# Patient Record
Sex: MALE | Race: WHITE | NOT HISPANIC OR LATINO | Employment: FULL TIME | URBAN - METROPOLITAN AREA
[De-identification: names, ages, dates, MRNs, and addresses within clinical notes are randomized per-mention and may not be internally consistent; named-entity substitution may affect disease eponyms.]

---

## 2021-07-16 ENCOUNTER — OFFICE VISIT (OUTPATIENT)
Dept: URGENT CARE | Facility: URGENT CARE | Age: 51
End: 2021-07-16
Payer: COMMERCIAL

## 2021-07-16 VITALS
SYSTOLIC BLOOD PRESSURE: 137 MMHG | DIASTOLIC BLOOD PRESSURE: 77 MMHG | HEART RATE: 82 BPM | TEMPERATURE: 98.3 F | OXYGEN SATURATION: 99 %

## 2021-07-16 DIAGNOSIS — N10 ACUTE PYELONEPHRITIS: Primary | ICD-10-CM

## 2021-07-16 DIAGNOSIS — R39.15 URINARY URGENCY: ICD-10-CM

## 2021-07-16 DIAGNOSIS — R30.0 DYSURIA: ICD-10-CM

## 2021-07-16 LAB
ALBUMIN UR-MCNC: 30 MG/DL
APPEARANCE UR: ABNORMAL
BACTERIA #/AREA URNS HPF: ABNORMAL /HPF
BILIRUB UR QL STRIP: NEGATIVE
COLOR UR AUTO: YELLOW
GLUCOSE UR STRIP-MCNC: NEGATIVE MG/DL
HGB UR QL STRIP: ABNORMAL
KETONES UR STRIP-MCNC: NEGATIVE MG/DL
LEUKOCYTE ESTERASE UR QL STRIP: ABNORMAL
NITRATE UR QL: POSITIVE
PH UR STRIP: 7 [PH] (ref 5–7)
RBC #/AREA URNS AUTO: ABNORMAL /HPF
SP GR UR STRIP: 1.01 (ref 1–1.03)
SQUAMOUS #/AREA URNS AUTO: ABNORMAL /LPF
UROBILINOGEN UR STRIP-ACNC: 1 E.U./DL
WBC #/AREA URNS AUTO: >100 /HPF
WBC CLUMPS #/AREA URNS HPF: PRESENT /HPF

## 2021-07-16 PROCEDURE — 99204 OFFICE O/P NEW MOD 45 MIN: CPT | Performed by: FAMILY MEDICINE

## 2021-07-16 PROCEDURE — 87086 URINE CULTURE/COLONY COUNT: CPT | Performed by: FAMILY MEDICINE

## 2021-07-16 PROCEDURE — 87186 SC STD MICRODIL/AGAR DIL: CPT | Performed by: FAMILY MEDICINE

## 2021-07-16 PROCEDURE — 81001 URINALYSIS AUTO W/SCOPE: CPT | Performed by: FAMILY MEDICINE

## 2021-07-16 RX ORDER — CEFDINIR 300 MG/1
300 CAPSULE ORAL 2 TIMES DAILY
Qty: 20 CAPSULE | Refills: 0 | Status: SHIPPED | OUTPATIENT
Start: 2021-07-16 | End: 2021-07-26

## 2021-07-16 RX ORDER — CITALOPRAM HYDROBROMIDE 10 MG/1
10 TABLET ORAL DAILY
COMMUNITY
Start: 2021-06-28

## 2021-07-16 NOTE — PATIENT INSTRUCTIONS
follow up if you have climbing fevers (100.4 F or higher), severe lightheadedness, worsening back/pelvic pain, severe vomiting.      follow up if not better in 5-7 days.      Patient Education     Pyelonephritis or Kidney Infection (Adult Male)  An infection of one or both kidneys is called pyelonephritis. It usually happens when bacteria (or rarely, viruses, fungi, or other disease-causing organisms) get into the kidneys. The bacteria (or other disease-causing organisms) can enter the kidneys from the bladder or blood traveling from other parts of the body to cause pyelonephritis. A kidney infection can become serious. It can cause severe illness, scarring of the kidneys, or kidney failure if not treated properly.    Common causes include:    Not keeping the genital area clean and dry, which promotes the growth of bacteria    Wearing tight pants or underwear, which allows moisture to build up in the genital area, helping bacteria grow    Holding urine for long periods of time    Dehydration  Kidney infections can cause symptoms similar to bladder infections. The infection can cause one or more of these symptoms:     Pain or burning when urinating    Having to urinate more often than usual    Blood in urine (pink or red)    Belly pain or discomfort, usually in the lower abdomen    Pain in the side or back    Pain above the pubic bone    Fever or chills    Vomiting    Loss of appetite  Treatment is oral antibiotics, or in more severe cases, intramuscular or intravenous (IV) antibiotics. These are started right away. Treatment helps prevent a more serious kidney infection.   Medicines  Medicines can help in the treatment of kidney infection:    Take antibiotics until they are used up, even if you feel better. It's important to finish them to make sure the infection is gone.    Unless another medicine was given, you can use over-the-counter medicines for pain, fever, or discomfort. If you have chronic liver or kidney  disease, talk with your healthcare provider before taking these medicines. Also tell your provider if you've ever had a stomach ulcer of gastrointestinal bleeding, or are taking blood thinners.  Home care  These guidelines can help you care for yourself at home:     Stay home from work or school. Rest in bed until your fever breaks and you are feeling better, or as advised by your healthcare provider.    Drink lots of fluid unless you must restrict fluids for other medical reasons. This will force the medicine into your urinary system and help flush the bacteria out of your body. Ask your healthcare provider how much you should drink.    Don't have sex until you have finished all of your medicine and your symptoms are gone.    Don't have caffeine, alcohol, and spicy foods. These foods may irritate the kidneys and bladder.    Wear loose-fitting clothes and cotton underwear.  Prevention  These self-care steps can help prevent future infections:    Drink plenty of fluids to prevent dehydration and flush out the bladder. Do this unless you must restrict your fluids for other health reasons, or your healthcare provider told you not to.    Wash your hands after using the bathroom.    Clean your penis regularly. If you aren't circumcised, pull back the foreskin when cleaning.    Urinate more often. Don't try to hold urine in for a long time.    Don't wear tight-fitting pants or underwear.    Improve your diet and prevent constipation. Eat more fresh fruit, vegetables, and fiber. Eat less junk and fatty foods. Constipation can increase your chance of getting a urinary tract infection. Talk with your healthcare provider if you have trouble with bowel movements.    Urinate right after sex to flush out the bladder.    Don't follow high-risk sexual behaviors.  Follow-up care  Follow up with your healthcare provider, or as advised. You may need more testing to make sure the infection is getting better. Close follow-up and  further testing is very important to find the cause and to prevent future infections.   If a urine culture was done, you will be told if your treatment needs to be changed. If directed, you can call to find out the results.   If you had an X-ray, CT scan, or another diagnostic test, you will be told of any new findings that may affect your care.   Call 911  Call 911 if any of the following occur:     Trouble breathing    Fainting or loss of consciousness    Rapid or very slow heart rate    Weakness, dizziness, or fainting    Trouble arousing or confusion  When to seek medical advice  Call your healthcare provider right away if any of the following occur:    Fever of 100.4 F (38 C) or higher, or as directed by your healthcare provider    Not feeling better within 1 to 2 days after starting antibiotics    Any symptom that continues after 3 days of treatment    Increasing pain in the stomach, back, side, or groin area    Repeated vomiting    Not able to take prescribed medicine due to nausea or another reason    Bloody, dark-colored, or foul smelling urine    Trouble urinating or decreased urine output    No urine for 8 hours, no tears when crying, sunken eyes, or dry mouth    New symptoms or symptoms get worse  [employer] last reviewed this educational content on 3/1/2020    5587-5119 [employer]. All rights reserved. This information is not intended as a substitute for professional medical care. Always follow your healthcare professional's instructions.

## 2021-07-16 NOTE — PROGRESS NOTES
SUBJECTIVE:   Usama Agudelo is a 50 year old male who  presents today for a possible UTI. Symptoms of dark orange cloudy urine with a red tinge, bilateral pelvic discomfort, urinary urgency and left low back pain have been going on since yesterday.  Hematuria yes, with a red tinge..No weaker stream  There is no history of fever, chills, nausea or vomiting.  No history of penile discharge. This patient does not have a history of frequent recurrent urinary tract infections.     No history of sexually transmitted diseases.  No history of prostate problems.       Past Medical History:    Depression    Current Outpatient Medications   Medication Sig Dispense Refill     citalopram (CELEXA) 10 MG tablet Take 10 mg by mouth daily       ibuprofen (ADVIL/MOTRIN) 100 MG tablet Take 100 mg by mouth every 4 hours as needed       Social History     Tobacco Use     Smoking status: Not on file   Substance Use Topics     Alcohol use: Not on file       ROS:   CONSTITUTIONAL:negative for fevers.    INTEGUMENTARY/SKIN: No rashes.  No bruising.    :  Positive for urinary frequency, left low back pain.      OBJECTIVE:  /77   Pulse 82   Temp 98.3  F (36.8  C)   SpO2 99%   GENERAL APPEARANCE: healthy, alert and no distress  ABDOMEN: soft, normal bowel sounds, with some discomfort over the bilateral pelvic regions.  No guarding/rebound/distension.    BACK: There is some left-sided CVA tenderness with percussion.    SKIN: no suspicious lesions or rashes    LAB:    Results for orders placed or performed in visit on 07/16/21   UA reflex to Microscopic and Culture     Status: Abnormal    Specimen: Urine, Clean Catch   Result Value Ref Range    Color Urine Yellow Colorless, Straw, Light Yellow, Yellow    Appearance Urine Cloudy (A) Clear    Glucose Urine Negative Negative mg/dL    Bilirubin Urine Negative Negative    Ketones Urine Negative Negative mg/dL    Specific Gravity Urine 1.010 1.003 - 1.035    Blood Urine Large (A) Negative     pH Urine 7.0 5.0 - 7.0    Protein Albumin Urine 30  (A) Negative mg/dL    Urobilinogen Urine 1.0 0.2, 1.0 E.U./dL    Nitrite Urine Positive (A) Negative    Leukocyte Esterase Urine Large (A) Negative   Urine Microscopic Exam     Status: Abnormal   Result Value Ref Range    Bacteria Urine Moderate (A) None Seen /HPF    RBC Urine 25-50 (A) 0-2 /HPF /HPF    WBC Urine >100 (A) 0-5 /HPF /HPF    Squamous Epithelials Urine Moderate (A) None Seen /LPF    WBC Clumps Urine Present (A) None Seen /HPF     .      ASSESSMENT:   Urinary urgency  Dysuria  Acute Pyelonephritis.    PLAN:  Rx:  Cefdinir    Pending lab:  Urine Culture.      Drink plenty of fluids.      follow up if you have climbing fevers (100.4 F or higher), severe lightheadedness, worsening back/pelvic pain, severe vomiting.      follow up if not better in 5-7 days.     Sha Lloyd MD

## 2021-07-18 LAB — BACTERIA UR CULT: ABNORMAL

## 2021-07-30 ENCOUNTER — OFFICE VISIT (OUTPATIENT)
Dept: URGENT CARE | Facility: URGENT CARE | Age: 51
End: 2021-07-30
Payer: COMMERCIAL

## 2021-07-30 VITALS
SYSTOLIC BLOOD PRESSURE: 116 MMHG | HEART RATE: 71 BPM | DIASTOLIC BLOOD PRESSURE: 58 MMHG | OXYGEN SATURATION: 100 % | TEMPERATURE: 99.8 F

## 2021-07-30 DIAGNOSIS — R10.9 FLANK PAIN: ICD-10-CM

## 2021-07-30 DIAGNOSIS — R30.0 DYSURIA: ICD-10-CM

## 2021-07-30 DIAGNOSIS — N12 PYELONEPHRITIS: Primary | ICD-10-CM

## 2021-07-30 LAB
ALBUMIN SERPL-MCNC: 3.4 G/DL (ref 3.4–5)
ALBUMIN UR-MCNC: >=300 MG/DL
ALP SERPL-CCNC: 83 U/L (ref 40–150)
ALT SERPL W P-5'-P-CCNC: 19 U/L
ANION GAP SERPL CALCULATED.3IONS-SCNC: 2 MMOL/L (ref 3–14)
APPEARANCE UR: ABNORMAL
AST SERPL W P-5'-P-CCNC: 21 U/L (ref 0–45)
BACTERIA #/AREA URNS HPF: ABNORMAL /HPF
BASOPHILS # BLD AUTO: 0 10E3/UL (ref 0–0.2)
BASOPHILS NFR BLD AUTO: 0 %
BILIRUB SERPL-MCNC: 0.9 MG/DL (ref 0.2–1.3)
BILIRUB UR QL STRIP: NEGATIVE
BUN SERPL-MCNC: 11 MG/DL (ref 7–30)
CALCIUM SERPL-MCNC: 9 MG/DL (ref 8.5–10.1)
CHLORIDE BLD-SCNC: 104 MMOL/L (ref 94–109)
CO2 SERPL-SCNC: 30 MMOL/L (ref 20–32)
COLOR UR AUTO: YELLOW
CREAT SERPL-MCNC: 1 MG/DL (ref 0.66–1.25)
EOSINOPHIL # BLD AUTO: 0.1 10E3/UL (ref 0–0.7)
EOSINOPHIL NFR BLD AUTO: 1 %
ERYTHROCYTE [DISTWIDTH] IN BLOOD BY AUTOMATED COUNT: 11.8 % (ref 10–15)
GFR SERPL CREATININE-BSD FRML MDRD: 89 ML/MIN/1.73M2
GLUCOSE BLD-MCNC: 151 MG/DL (ref 70–99)
GLUCOSE UR STRIP-MCNC: NEGATIVE MG/DL
HCT VFR BLD AUTO: 40.2 % (ref 40–53)
HGB BLD-MCNC: 13.9 G/DL (ref 13.3–17.7)
HGB UR QL STRIP: ABNORMAL
KETONES UR STRIP-MCNC: NEGATIVE MG/DL
LEUKOCYTE ESTERASE UR QL STRIP: ABNORMAL
LYMPHOCYTES # BLD AUTO: 1.5 10E3/UL (ref 0.8–5.3)
LYMPHOCYTES NFR BLD AUTO: 12 %
MCH RBC QN AUTO: 31.3 PG (ref 26.5–33)
MCHC RBC AUTO-ENTMCNC: 34.6 G/DL (ref 31.5–36.5)
MCV RBC AUTO: 91 FL (ref 78–100)
MONOCYTES # BLD AUTO: 0.6 10E3/UL (ref 0–1.3)
MONOCYTES NFR BLD AUTO: 5 %
NEUTROPHILS # BLD AUTO: 9.8 10E3/UL (ref 1.6–8.3)
NEUTROPHILS NFR BLD AUTO: 82 %
NITRATE UR QL: POSITIVE
PH UR STRIP: 5.5 [PH] (ref 5–7)
PLATELET # BLD AUTO: 197 10E3/UL (ref 150–450)
POTASSIUM BLD-SCNC: 3.5 MMOL/L (ref 3.4–5.3)
PROT SERPL-MCNC: 6.1 G/DL (ref 6.8–8.8)
RBC # BLD AUTO: 4.44 10E6/UL (ref 4.4–5.9)
RBC #/AREA URNS AUTO: >100 /HPF
SODIUM SERPL-SCNC: 136 MMOL/L (ref 133–144)
SP GR UR STRIP: 1.01 (ref 1–1.03)
SQUAMOUS #/AREA URNS AUTO: ABNORMAL /LPF
UROBILINOGEN UR STRIP-ACNC: 0.2 E.U./DL
WBC # BLD AUTO: 12 10E3/UL (ref 4–11)
WBC #/AREA URNS AUTO: >100 /HPF

## 2021-07-30 PROCEDURE — 87086 URINE CULTURE/COLONY COUNT: CPT | Performed by: FAMILY MEDICINE

## 2021-07-30 PROCEDURE — 99214 OFFICE O/P EST MOD 30 MIN: CPT | Performed by: FAMILY MEDICINE

## 2021-07-30 PROCEDURE — 87186 SC STD MICRODIL/AGAR DIL: CPT | Performed by: FAMILY MEDICINE

## 2021-07-30 PROCEDURE — 81001 URINALYSIS AUTO W/SCOPE: CPT | Performed by: FAMILY MEDICINE

## 2021-07-30 PROCEDURE — 36415 COLL VENOUS BLD VENIPUNCTURE: CPT | Performed by: FAMILY MEDICINE

## 2021-07-30 PROCEDURE — 85025 COMPLETE CBC W/AUTO DIFF WBC: CPT | Performed by: FAMILY MEDICINE

## 2021-07-30 PROCEDURE — 80053 COMPREHEN METABOLIC PANEL: CPT | Performed by: FAMILY MEDICINE

## 2021-07-30 RX ORDER — SULFAMETHOXAZOLE/TRIMETHOPRIM 800-160 MG
1 TABLET ORAL 2 TIMES DAILY
Qty: 20 TABLET | Refills: 0 | Status: SHIPPED | OUTPATIENT
Start: 2021-07-30 | End: 2021-08-09

## 2021-07-31 NOTE — PROGRESS NOTES
SUBJECTIVE:   Usama Agudelo is a 50 year old male who  presents today for a possible kidney infection.    Patient was seen 2 weeks ago, 7/16 with diagnosis of pyelonephritis.  RX Omnicef given and did well, finished full course of antibiotic.  Urine culture positive for E.coli, pan-sensitive.       Noted urine was still mildly cloudy this week and then developed back pain yesterday, this resolved but then started up again more intensely today.  No fever.  No pain in rectal area.  Patient comments that has had urinary stream issues, not as strong or at time will take a bit before able to go.      No past medical history on file.  Current Outpatient Medications   Medication Sig Dispense Refill     citalopram (CELEXA) 10 MG tablet Take 10 mg by mouth daily       ibuprofen (ADVIL/MOTRIN) 100 MG tablet Take 100 mg by mouth every 4 hours as needed       Social History     Tobacco Use     Smoking status: Not on file   Substance Use Topics     Alcohol use: Not on file       ROS:   Review of systems negative except as stated above.    OBJECTIVE:  /58   Pulse 71   Temp 99.8  F (37.7  C)   SpO2 100%   GENERAL APPEARANCE: healthy, alert and no distress  PSYCH: mentation appears normal and affect normal/bright    Results for orders placed or performed in visit on 07/30/21   UA macro with reflex to Microscopic and Culture - Clinc Collect     Status: Abnormal    Specimen: Urine, Midstream   Result Value Ref Range    Color Urine Yellow Colorless, Straw, Light Yellow, Yellow    Appearance Urine Cloudy (A) Clear    Glucose Urine Negative Negative mg/dL    Bilirubin Urine Negative Negative    Ketones Urine Negative Negative mg/dL    Specific Gravity Urine 1.015 1.003 - 1.035    Blood Urine Large (A) Negative    pH Urine 5.5 5.0 - 7.0    Protein Albumin Urine >=300 (A) Negative mg/dL    Urobilinogen Urine 0.2 0.2, 1.0 E.U./dL    Nitrite Urine Positive (A) Negative    Leukocyte Esterase Urine Small (A) Negative    Comprehensive metabolic panel (BMP + Alb, Alk Phos, ALT, AST, Total. Bili, TP)     Status: Abnormal   Result Value Ref Range    Sodium 136 133 - 144 mmol/L    Potassium 3.5 3.4 - 5.3 mmol/L    Chloride 104 94 - 109 mmol/L    Carbon Dioxide (CO2) 30 20 - 32 mmol/L    Anion Gap 2 (L) 3 - 14 mmol/L    Urea Nitrogen 11 7 - 30 mg/dL    Creatinine 1.00 0.66 - 1.25 mg/dL    Calcium 9.0 8.5 - 10.1 mg/dL    Glucose 151 (H) 70 - 99 mg/dL    Alkaline Phosphatase 83 40 - 150 U/L    AST 21 0 - 45 U/L    ALT 19 U/L    Protein Total 6.1 (L) 6.8 - 8.8 g/dL    Albumin 3.4 3.4 - 5.0 g/dL    Bilirubin Total 0.9 0.2 - 1.3 mg/dL    GFR Estimate 89 >60 mL/min/1.73m2   CBC with platelets and differential     Status: Abnormal   Result Value Ref Range    WBC Count 12.0 (H) 4.0 - 11.0 10e3/uL    RBC Count 4.44 4.40 - 5.90 10e6/uL    Hemoglobin 13.9 13.3 - 17.7 g/dL    Hematocrit 40.2 40.0 - 53.0 %    MCV 91 78 - 100 fL    MCH 31.3 26.5 - 33.0 pg    MCHC 34.6 31.5 - 36.5 g/dL    RDW 11.8 10.0 - 15.0 %    Platelet Count 197 150 - 450 10e3/uL    % Neutrophils 82 %    % Lymphocytes 12 %    % Monocytes 5 %    % Eosinophils 1 %    % Basophils 0 %    Absolute Neutrophils 9.8 (H) 1.6 - 8.3 10e3/uL    Absolute Lymphocytes 1.5 0.8 - 5.3 10e3/uL    Absolute Monocytes 0.6 0.0 - 1.3 10e3/uL    Absolute Eosinophils 0.1 0.0 - 0.7 10e3/uL    Absolute Basophils 0.0 0.0 - 0.2 10e3/uL   Urine Microscopic Exam     Status: Abnormal   Result Value Ref Range    Bacteria Urine Moderate (A) None Seen /HPF    RBC Urine >100 (A) 0-2 /HPF /HPF    WBC Urine >100 (A) 0-5 /HPF /HPF    Squamous Epithelials Urine Few (A) None Seen /LPF   CBC with platelets and differential     Status: Abnormal    Narrative    The following orders were created for panel order CBC with platelets and differential.  Procedure                               Abnormality         Status                     ---------                               -----------         ------                     CBC  with platelets and d...[[id number]]  Abnormal            Final result                 Please view results for these tests on the individual orders.       ASSESSMENT/PLAN:   (N12) Pyelonephritis  (primary encounter diagnosis)  Plan: sulfamethoxazole-trimethoprim (BACTRIM DS)         800-160 MG tablet            (R30.0) Dysuria  Plan: UA macro with reflex to Microscopic and Culture        - Clinc Collect, Urine Microscopic Exam, Urine         Culture, sulfamethoxazole-trimethoprim (BACTRIM        DS) 800-160 MG tablet            (R10.9) Flank pain  Plan: CBC with platelets and differential,         Comprehensive metabolic panel (BMP + Alb, Alk         Phos, ALT, AST, Total. Bili, TP)        Df      Concerning for pyelonephritis, reviewed labs and discussed importance of follow up with primary provider.  RX Bactrim DS given for 10 days, will follow up on urine culture and adjust medication if needed.  Drink plenty of fluids.  Prevention and treatment of UTI's discussed.  Reviewed possible prostate being source and this may need to be evaluated further either by primary provider or urology.    Follow up with primary provider in 2-3 weeks for recheck.    Lamont Zaragoza MD  July 30, 2021 7:42 PM

## 2021-08-02 LAB — BACTERIA UR CULT: ABNORMAL

## 2021-09-28 ENCOUNTER — OFFICE VISIT (OUTPATIENT)
Dept: URGENT CARE | Facility: URGENT CARE | Age: 51
End: 2021-09-28
Payer: COMMERCIAL

## 2021-09-28 VITALS
SYSTOLIC BLOOD PRESSURE: 120 MMHG | TEMPERATURE: 97.3 F | HEART RATE: 49 BPM | OXYGEN SATURATION: 100 % | DIASTOLIC BLOOD PRESSURE: 74 MMHG

## 2021-09-28 DIAGNOSIS — R31.0 GROSS HEMATURIA: Primary | ICD-10-CM

## 2021-09-28 LAB
ALBUMIN UR-MCNC: 30 MG/DL
APPEARANCE UR: ABNORMAL
BACTERIA #/AREA URNS HPF: ABNORMAL /HPF
BILIRUB UR QL STRIP: NEGATIVE
COLOR UR AUTO: YELLOW
GLUCOSE UR STRIP-MCNC: NEGATIVE MG/DL
HGB UR QL STRIP: ABNORMAL
KETONES UR STRIP-MCNC: NEGATIVE MG/DL
LEUKOCYTE ESTERASE UR QL STRIP: ABNORMAL
NITRATE UR QL: NEGATIVE
PH UR STRIP: 7.5 [PH] (ref 5–7)
RBC #/AREA URNS AUTO: ABNORMAL /HPF
SP GR UR STRIP: 1.02 (ref 1–1.03)
SQUAMOUS #/AREA URNS AUTO: ABNORMAL /LPF
UROBILINOGEN UR STRIP-ACNC: 0.2 E.U./DL
WBC #/AREA URNS AUTO: ABNORMAL /HPF

## 2021-09-28 PROCEDURE — 81001 URINALYSIS AUTO W/SCOPE: CPT | Performed by: PHYSICIAN ASSISTANT

## 2021-09-28 PROCEDURE — 99214 OFFICE O/P EST MOD 30 MIN: CPT | Performed by: PHYSICIAN ASSISTANT

## 2021-09-28 PROCEDURE — 87086 URINE CULTURE/COLONY COUNT: CPT | Performed by: PHYSICIAN ASSISTANT

## 2021-09-28 RX ORDER — SULFAMETHOXAZOLE/TRIMETHOPRIM 800-160 MG
1 TABLET ORAL 2 TIMES DAILY
Qty: 20 TABLET | Refills: 0 | Status: SHIPPED | OUTPATIENT
Start: 2021-09-28 | End: 2021-10-08

## 2021-09-28 NOTE — PROGRESS NOTES
SUBJECTIVE:    Usama Agudelo is a 50 year old male who  presents today for a possible UTI. Symptoms of dysuria, urgency, frequency and burning have been going on for 1week(s).  Hematuria yes for 3 days.  gradual onsetand mild.  There is no history of fever, chills, nausea or vomiting.  No history of vaginal or penile discharge. This patient does have a history of urinary tract infections. Patient denies rigors, flank pain, temperature > 101 degrees F., Vomiting, significant nausea or diarrhea, taking Coumadin and GFR less than 30 within the last year.    No past medical history on file.  Current Outpatient Medications   Medication Sig Dispense Refill     citalopram (CELEXA) 10 MG tablet Take 10 mg by mouth daily       ibuprofen (ADVIL/MOTRIN) 100 MG tablet Take 100 mg by mouth every 4 hours as needed       Social History     Tobacco Use     Smoking status: Never Smoker     Smokeless tobacco: Never Used   Substance Use Topics     Alcohol use: Not on file       ROS:   Review of systems negative except as stated above.    OBJECTIVE:  /74   Pulse (!) 49   Temp 97.3  F (36.3  C)   SpO2 100%   GENERAL APPEARANCE: healthy, alert and no distress  RESP: lungs clear to auscultation - no rales, rhonchi or wheezes  CV: regular rates and rhythm, normal S1 S2, no murmur noted  ABDOMEN:  soft, nontender, no HSM or masses and bowel sounds normal  BACK: No CVA tenderness  SKIN: no suspicious lesions or rashes  NEURO: Normal strength and tone, sensory exam grossly normal,  normal speech and mentation      Results for orders placed or performed in visit on 09/28/21   UA macro with reflex to Microscopic and Culture - Clinc Collect     Status: Abnormal    Specimen: Urine, Clean Catch   Result Value Ref Range    Color Urine Yellow Colorless, Straw, Light Yellow, Yellow    Appearance Urine Slightly Cloudy (A) Clear    Glucose Urine Negative Negative mg/dL    Bilirubin Urine Negative Negative    Ketones Urine Negative Negative  mg/dL    Specific Gravity Urine 1.020 1.003 - 1.035    Blood Urine Large (A) Negative    pH Urine 7.5 (H) 5.0 - 7.0    Protein Albumin Urine 30  (A) Negative mg/dL    Urobilinogen Urine 0.2 0.2, 1.0 E.U./dL    Nitrite Urine Negative Negative    Leukocyte Esterase Urine Moderate (A) Negative   Urine Microscopic Exam     Status: Abnormal   Result Value Ref Range    Bacteria Urine Moderate (A) None Seen /HPF    RBC Urine 25-50 (A) 0-2 /HPF /HPF    WBC Urine  (A) 0-5 /HPF /HPF    Squamous Epithelials Urine Few (A) None Seen /LPF   Urine Culture     Status: None    Specimen: Urine, Clean Catch   Result Value Ref Range    Culture <10,000 CFU/mL Urogenital anitha        ASSESSMENT:   (R31.0) Gross hematuria  (primary encounter diagnosis)  Plan: UA macro with reflex to Microscopic and Culture        - Clinc Collect, Urine Microscopic Exam, Urine         Culture, sulfamethoxazole-trimethoprim (BACTRIM        DS) 800-160 MG tablet      Red flags and emergent follow up discussed, and understood by patient  Follow up with PCP if symptoms worsen or fail to improve      Patient Instructions     Patient Education     Hematuria: Possible Causes     Many things can lead to blood in the urine (hematuria). The blood may be seen with the eye (macroscopic or gross hematuria). Or it may only be seen when the urine is looked at under a microscope (microscopic hematuria). Often no cause for the blood can be found. This is called idiopathic hematuria. Here are some of the most common causes of blood in the urine:     Kidney or bladder stones. These are collections of crystals. They form in the urine. Stones may be found anywhere in the urinary tract. But they form most often in the kidneys or bladder. In addition to blood in the urine, they can cause severe pain.    BPH (benign prostatic hyperplasia). This is enlargement of the prostate gland. It happens as men age. BPH often causes problems with urination. It sometimes causes blood in  the urine.    Urinary tract infection (UTI). This is due to bacteria growing in the urinary tract. It can cause blood in the urine. Other symptoms include burning or pain with urination. You may need to urinate often or urgently. You may also have a fever.    Damage to the urinary tract may cause blood in the urine. This damage may be due to a blow or accident. It may also result from the use of a urinary catheter. Very hard exercise may sometimes irritate the urinary tract and cause bleeding.    Cancer may occur anywhere in the urinary tract. A tumor may sometimes cause no symptoms other than bleeding.  Other possible causes of bleeding include:     Prostate gland infection (prostatitis)    Taking anticoagulants    Blockage in the urinary tract    Kidney disease or inflammation    Cystic diseases of the kidneys    Sickle cell anemia    Vigorous exercise    Endometriosis  Francisco last reviewed this educational content on 7/1/2019 2000-2021 [employer]. All rights reserved. This information is not intended as a substitute for professional medical care. Always follow your healthcare professional's instructions.           Patient Education     Bladder Infection, Male (Adult)    You have a bladder infection.  Urine is normally free of bacteria. But bacteria can get into the urinary tract from the skin around the rectum. Or it may travel in the blood from other parts of the body.   This is called a urinary tract infection (UTI). An infection can occur anywhere in the urinary tract. It could be in a kidney (pyelonephritis) or in the bladder (cystitis) and urethra (urethritis). The urethra is the tube that drains urine from the bladder through the tip of the penis.   The most common place for a UTI is in the bladder. This is called a bladder infection. Most bladder infections are easily treated. They are not serious unless the infection spreads up to the kidney.   The terms bladder infection, UTI, and  cystitis are often used to describe the same thing. But they aren t always the same. Cystitis is an inflammation of the bladder. The most common cause of cystitis is an infection.    Keep in mind:    Infections in the urine are called UTIs.    Cystitis is often caused by a UTI.    Not all UTIs and cases of cystitis are bladder infections.    Bladder infections are the most common type of cystitis.  Symptoms of a bladder infection  The infection causes inflammation in the urethra and bladder. This inflammation causes many of the symptoms. The most common symptoms of a bladder infection are:     Pain or burning when urinating    Having to go more often than normal    Feeling like you need to go right away    Only a small amount of urine comes out    Blood in urine    Discomfort in your belly (abdomen), often in the lower belly, above the pubic bone    Cloudy, strong, or bad-smelling urine    Unable to urinate (retention)    Urinary incontinence    Fever    Loss of appetite  Older adults may also feel confused.   Causes of a bladder infection  Bladder infections are not contagious. You can't get one from someone else, from a toilet seat, or from sharing a bath.   The most common cause of bladder infections is bacteria from the bowels. The bacteria get onto the skin around the opening of the urethra. From there they can get into the urine and travel up to the bladder. This causes inflammation and an infection. This often happens because of:     An enlarged prostate    Poor cleaning of the genitals    Procedures that put a tube in your bladder, such as a Anguiano catheter    Bowel incontinence    Older age    Not emptying your bladder (the urine stays there, giving the bacteria a chance to grow)    Dehydration (this lets urine to stay in the bladder longer)    Constipation (this can cause the bowels to push on the bladder or urethra and keep the bladder from emptying)  Treatment  Bladder infections are treated with  antibiotics. They often clear up quickly without complications. Treatment helps prevent a more serious kidney infection.   Medicines  Medicines can help in treating a bladder infection:     You may have been given phenazopyridine to ease burning when you urinate. It will cause your urine to be bright orange. It can stain clothing.    You may have been prescribed antibiotics. Take this medicine until you have finished it, even if you feel better. Taking all of the medicine will make sure the infection has cleared.  You can use acetaminophen or ibuprofen for pain, fever, or discomfort, unless another medicine was prescribed. You can also alternate them, or use both together. They work differently and are a different class of medicines, so taking them together is not an overdose. If you have chronic liver or kidney disease, talk with your healthcare provider before using these medicines. Also talk with your provider if you ve had a stomach ulcer or GI (gastrointestinal) bleeding or are taking blood thinner medicines.   Home care  Here are some guidelines to help you care for yourself at home:     Drink plenty of fluids, unless your healthcare provider told you not to. Fluids will prevent dehydration and flush out your bladder.    Use good personal hygiene. Wipe from front to back after using the toilet, and clean your penis regularly. If you aren t circumcised, retract the foreskin when cleaning.    Urinate more often, and don t try to hold it in for long periods of time, if possible.    Wear loose-fitting clothes and cotton underwear. Don't wear tight-fitting pants. This helps keep you clean and dry.    Change your diet to prevent constipation. This means eating more fresh foods and more fiber, and less junk and fatty foods.    Don't have sex until your symptoms are gone.    Don't have caffeine, alcohol, and spicy foods. These can irritate the bladder.  Follow-up care  Follow up with your healthcare provider, or as  advised, if all symptoms have not cleared up in 5 days. It's important to keep your follow-up appointment. You can talk with your provider to see if you need more tests of the urinary tract. This is especially important if you have infections that keep coming back.   If a culture was done, you will be told if your treatment needs to be changed. If directed, you can call to find out the results.   If X-rays were taken, you will be told of any findings that may affect your care.   Call 911  Call 911 if any of these occur:     Trouble breathing    Trouble waking up    Feeling confused    Fainting or loss of consciousness    Fast heart rate  When to get medical advice  Call your healthcare provider right away if any of these occur:     Fever of 100.4 F (38 C) or higher, or as directed by your provider    Your symptoms don t improve after 2 days of treatment    Back or belly pain that gets worse    Repeated vomiting, or you aren t able to keep medicine down    Weakness or dizziness  Francisco last reviewed this educational content on 10/1/2019    6477-6289 [employer] All rights reserved. This information is not intended as a substitute for professional medical care. Always follow your healthcare professional's instructions.

## 2021-09-28 NOTE — PATIENT INSTRUCTIONS
Patient Education     Hematuria: Possible Causes     Many things can lead to blood in the urine (hematuria). The blood may be seen with the eye (macroscopic or gross hematuria). Or it may only be seen when the urine is looked at under a microscope (microscopic hematuria). Often no cause for the blood can be found. This is called idiopathic hematuria. Here are some of the most common causes of blood in the urine:     Kidney or bladder stones. These are collections of crystals. They form in the urine. Stones may be found anywhere in the urinary tract. But they form most often in the kidneys or bladder. In addition to blood in the urine, they can cause severe pain.    BPH (benign prostatic hyperplasia). This is enlargement of the prostate gland. It happens as men age. BPH often causes problems with urination. It sometimes causes blood in the urine.    Urinary tract infection (UTI). This is due to bacteria growing in the urinary tract. It can cause blood in the urine. Other symptoms include burning or pain with urination. You may need to urinate often or urgently. You may also have a fever.    Damage to the urinary tract may cause blood in the urine. This damage may be due to a blow or accident. It may also result from the use of a urinary catheter. Very hard exercise may sometimes irritate the urinary tract and cause bleeding.    Cancer may occur anywhere in the urinary tract. A tumor may sometimes cause no symptoms other than bleeding.  Other possible causes of bleeding include:     Prostate gland infection (prostatitis)    Taking anticoagulants    Blockage in the urinary tract    Kidney disease or inflammation    Cystic diseases of the kidneys    Sickle cell anemia    Vigorous exercise    Endometriosis  Francisco last reviewed this educational content on 7/1/2019 2000-2021 [employer]. All rights reserved. This information is not intended as a substitute for professional medical care. Always follow  your healthcare professional's instructions.           Patient Education     Bladder Infection, Male (Adult)    You have a bladder infection.  Urine is normally free of bacteria. But bacteria can get into the urinary tract from the skin around the rectum. Or it may travel in the blood from other parts of the body.   This is called a urinary tract infection (UTI). An infection can occur anywhere in the urinary tract. It could be in a kidney (pyelonephritis) or in the bladder (cystitis) and urethra (urethritis). The urethra is the tube that drains urine from the bladder through the tip of the penis.   The most common place for a UTI is in the bladder. This is called a bladder infection. Most bladder infections are easily treated. They are not serious unless the infection spreads up to the kidney.   The terms bladder infection, UTI, and cystitis are often used to describe the same thing. But they aren t always the same. Cystitis is an inflammation of the bladder. The most common cause of cystitis is an infection.    Keep in mind:    Infections in the urine are called UTIs.    Cystitis is often caused by a UTI.    Not all UTIs and cases of cystitis are bladder infections.    Bladder infections are the most common type of cystitis.  Symptoms of a bladder infection  The infection causes inflammation in the urethra and bladder. This inflammation causes many of the symptoms. The most common symptoms of a bladder infection are:     Pain or burning when urinating    Having to go more often than normal    Feeling like you need to go right away    Only a small amount of urine comes out    Blood in urine    Discomfort in your belly (abdomen), often in the lower belly, above the pubic bone    Cloudy, strong, or bad-smelling urine    Unable to urinate (retention)    Urinary incontinence    Fever    Loss of appetite  Older adults may also feel confused.   Causes of a bladder infection  Bladder infections are not contagious. You  can't get one from someone else, from a toilet seat, or from sharing a bath.   The most common cause of bladder infections is bacteria from the bowels. The bacteria get onto the skin around the opening of the urethra. From there they can get into the urine and travel up to the bladder. This causes inflammation and an infection. This often happens because of:     An enlarged prostate    Poor cleaning of the genitals    Procedures that put a tube in your bladder, such as a Anguiano catheter    Bowel incontinence    Older age    Not emptying your bladder (the urine stays there, giving the bacteria a chance to grow)    Dehydration (this lets urine to stay in the bladder longer)    Constipation (this can cause the bowels to push on the bladder or urethra and keep the bladder from emptying)  Treatment  Bladder infections are treated with antibiotics. They often clear up quickly without complications. Treatment helps prevent a more serious kidney infection.   Medicines  Medicines can help in treating a bladder infection:     You may have been given phenazopyridine to ease burning when you urinate. It will cause your urine to be bright orange. It can stain clothing.    You may have been prescribed antibiotics. Take this medicine until you have finished it, even if you feel better. Taking all of the medicine will make sure the infection has cleared.  You can use acetaminophen or ibuprofen for pain, fever, or discomfort, unless another medicine was prescribed. You can also alternate them, or use both together. They work differently and are a different class of medicines, so taking them together is not an overdose. If you have chronic liver or kidney disease, talk with your healthcare provider before using these medicines. Also talk with your provider if you ve had a stomach ulcer or GI (gastrointestinal) bleeding or are taking blood thinner medicines.   Home care  Here are some guidelines to help you care for yourself at home:      Drink plenty of fluids, unless your healthcare provider told you not to. Fluids will prevent dehydration and flush out your bladder.    Use good personal hygiene. Wipe from front to back after using the toilet, and clean your penis regularly. If you aren t circumcised, retract the foreskin when cleaning.    Urinate more often, and don t try to hold it in for long periods of time, if possible.    Wear loose-fitting clothes and cotton underwear. Don't wear tight-fitting pants. This helps keep you clean and dry.    Change your diet to prevent constipation. This means eating more fresh foods and more fiber, and less junk and fatty foods.    Don't have sex until your symptoms are gone.    Don't have caffeine, alcohol, and spicy foods. These can irritate the bladder.  Follow-up care  Follow up with your healthcare provider, or as advised, if all symptoms have not cleared up in 5 days. It's important to keep your follow-up appointment. You can talk with your provider to see if you need more tests of the urinary tract. This is especially important if you have infections that keep coming back.   If a culture was done, you will be told if your treatment needs to be changed. If directed, you can call to find out the results.   If X-rays were taken, you will be told of any findings that may affect your care.   Call 911  Call 911 if any of these occur:     Trouble breathing    Trouble waking up    Feeling confused    Fainting or loss of consciousness    Fast heart rate  When to get medical advice  Call your healthcare provider right away if any of these occur:     Fever of 100.4 F (38 C) or higher, or as directed by your provider    Your symptoms don t improve after 2 days of treatment    Back or belly pain that gets worse    Repeated vomiting, or you aren t able to keep medicine down    Weakness or dizziness  [employer] last reviewed this educational content on 10/1/2019    [id number] [employer]. All rights  reserved. This information is not intended as a substitute for professional medical care. Always follow your healthcare professional's instructions.

## 2021-09-29 LAB — BACTERIA UR CULT: NORMAL

## 2025-04-04 ENCOUNTER — HOSPITAL ENCOUNTER (EMERGENCY)
Facility: CLINIC | Age: 55
Discharge: HOME | End: 2025-04-04
Attending: SOCIAL WORKER | Admitting: SOCIAL WORKER
Payer: COMMERCIAL

## 2025-04-04 ENCOUNTER — APPOINTMENT (OUTPATIENT)
Dept: CT IMAGING | Facility: CLINIC | Age: 55
End: 2025-04-04
Attending: SOCIAL WORKER
Payer: COMMERCIAL

## 2025-04-04 VITALS
WEIGHT: 249.34 LBS | TEMPERATURE: 97.7 F | HEART RATE: 54 BPM | DIASTOLIC BLOOD PRESSURE: 94 MMHG | BODY MASS INDEX: 33.05 KG/M2 | RESPIRATION RATE: 18 BRPM | OXYGEN SATURATION: 99 % | SYSTOLIC BLOOD PRESSURE: 126 MMHG | HEIGHT: 73 IN

## 2025-04-04 DIAGNOSIS — N32.0 BLADDER OUTLET OBSTRUCTION: ICD-10-CM

## 2025-04-04 DIAGNOSIS — R31.0 GROSS HEMATURIA: ICD-10-CM

## 2025-04-04 DIAGNOSIS — M54.50 ACUTE BILATERAL LOW BACK PAIN WITHOUT SCIATICA: ICD-10-CM

## 2025-04-04 LAB
ANION GAP SERPL CALCULATED.3IONS-SCNC: 9 MMOL/L (ref 7–15)
BASOPHILS # BLD AUTO: 0 10E3/UL (ref 0–0.2)
BASOPHILS NFR BLD AUTO: 1 %
BUN SERPL-MCNC: 12.1 MG/DL (ref 6–20)
CALCIUM SERPL-MCNC: 9.5 MG/DL (ref 8.8–10.4)
CHLORIDE SERPL-SCNC: 103 MMOL/L (ref 98–107)
CREAT SERPL-MCNC: 1.04 MG/DL (ref 0.67–1.17)
EGFRCR SERPLBLD CKD-EPI 2021: 86 ML/MIN/1.73M2
EOSINOPHIL # BLD AUTO: 0.1 10E3/UL (ref 0–0.7)
EOSINOPHIL NFR BLD AUTO: 1 %
ERYTHROCYTE [DISTWIDTH] IN BLOOD BY AUTOMATED COUNT: 11.7 % (ref 10–15)
GLUCOSE SERPL-MCNC: 85 MG/DL (ref 70–99)
HCO3 SERPL-SCNC: 27 MMOL/L (ref 22–29)
HCT VFR BLD AUTO: 46 % (ref 40–53)
HGB BLD-MCNC: 16.4 G/DL (ref 13.3–17.7)
HOLD SPECIMEN: NORMAL
HOLD SPECIMEN: NORMAL
IMM GRANULOCYTES # BLD: 0 10E3/UL
IMM GRANULOCYTES NFR BLD: 1 %
LYMPHOCYTES # BLD AUTO: 2.6 10E3/UL (ref 0.8–5.3)
LYMPHOCYTES NFR BLD AUTO: 40 %
MCH RBC QN AUTO: 31.5 PG (ref 26.5–33)
MCHC RBC AUTO-ENTMCNC: 35.7 G/DL (ref 31.5–36.5)
MCV RBC AUTO: 88 FL (ref 78–100)
MONOCYTES # BLD AUTO: 0.5 10E3/UL (ref 0–1.3)
MONOCYTES NFR BLD AUTO: 7 %
NEUTROPHILS # BLD AUTO: 3.3 10E3/UL (ref 1.6–8.3)
NEUTROPHILS NFR BLD AUTO: 51 %
NRBC # BLD AUTO: 0 10E3/UL
NRBC BLD AUTO-RTO: 0 /100
PLATELET # BLD AUTO: 201 10E3/UL (ref 150–450)
POTASSIUM SERPL-SCNC: 4 MMOL/L (ref 3.4–5.3)
RBC # BLD AUTO: 5.21 10E6/UL (ref 4.4–5.9)
SODIUM SERPL-SCNC: 139 MMOL/L (ref 135–145)
WBC # BLD AUTO: 6.6 10E3/UL (ref 4–11)

## 2025-04-04 PROCEDURE — 80048 BASIC METABOLIC PNL TOTAL CA: CPT | Performed by: SOCIAL WORKER

## 2025-04-04 PROCEDURE — 250N000011 HC RX IP 250 OP 636: Performed by: SOCIAL WORKER

## 2025-04-04 PROCEDURE — 99285 EMERGENCY DEPT VISIT HI MDM: CPT | Mod: 25

## 2025-04-04 PROCEDURE — 85025 COMPLETE CBC W/AUTO DIFF WBC: CPT | Performed by: SOCIAL WORKER

## 2025-04-04 PROCEDURE — 74177 CT ABD & PELVIS W/CONTRAST: CPT

## 2025-04-04 PROCEDURE — 36415 COLL VENOUS BLD VENIPUNCTURE: CPT | Performed by: SOCIAL WORKER

## 2025-04-04 PROCEDURE — 250N000009 HC RX 250: Performed by: SOCIAL WORKER

## 2025-04-04 PROCEDURE — 82310 ASSAY OF CALCIUM: CPT | Performed by: SOCIAL WORKER

## 2025-04-04 RX ORDER — IOPAMIDOL 755 MG/ML
500 INJECTION, SOLUTION INTRAVASCULAR ONCE
Status: COMPLETED | OUTPATIENT
Start: 2025-04-04 | End: 2025-04-04

## 2025-04-04 RX ADMIN — SODIUM CHLORIDE 65 ML: 9 INJECTION, SOLUTION INTRAVENOUS at 17:12

## 2025-04-04 RX ADMIN — IOPAMIDOL 100 ML: 755 INJECTION, SOLUTION INTRAVENOUS at 17:12

## 2025-04-04 ASSESSMENT — ACTIVITIES OF DAILY LIVING (ADL)
ADLS_ACUITY_SCORE: 41

## 2025-04-04 ASSESSMENT — COLUMBIA-SUICIDE SEVERITY RATING SCALE - C-SSRS
1. IN THE PAST MONTH, HAVE YOU WISHED YOU WERE DEAD OR WISHED YOU COULD GO TO SLEEP AND NOT WAKE UP?: NO
6. HAVE YOU EVER DONE ANYTHING, STARTED TO DO ANYTHING, OR PREPARED TO DO ANYTHING TO END YOUR LIFE?: NO
2. HAVE YOU ACTUALLY HAD ANY THOUGHTS OF KILLING YOURSELF IN THE PAST MONTH?: NO

## 2025-04-04 NOTE — ED PROVIDER NOTES
Emergency Department Note      History of Present Illness     Chief Complaint   Flank Pain and Hematuria      HPI   Usama Agudelo is a 54 year old male with a history of hyperlipidemia presenting for evaluation of flank pain and hematuria. The patient reports that yesterday he started passing strings of red blood clots when urinating. He is still having red specks in his urine today. The red clots are not liquid and the urine is not red, he is just passing clots. The patient endorses low back pain that started today. He has a history of bladder and kidney infections. Her father has a history of kidney cancer and this is his primary concern. He was seen in urgent care earlier today and sent here. The patient states that he had some congestion and productive cough earlier this week which has since resolved. The patient denies fever, chills, nausea, vomiting, abdominal pain, bloody or tarry stool, dysuria, penile discharge, urinary frequency, trouble emptying his bladder completely, significant changes in starting or maintaining urine stream. Not currently sexually active. The patient's primary care physician is Dr. Silverio at [facility] [place].    Independent Historian   None    Review of External Notes   I reviewed the office visit from earlier today seen for gross heamturia, told to come to ED           Results for orders placed or performed in visit on 04/04/25   UA with Microscopic reflex to Culture     Status: Abnormal     Specimen: Urine, Clean Catch   Result Value Ref Range     Color Urine Yellow Colorless, Straw, Light Yellow, Yellow     Appearance Urine Clear Clear     Glucose Urine Negative Negative mg/dL     Bilirubin Urine Negative Negative     Ketones Urine Negative Negative mg/dL     Specific Gravity Urine 1.015 1.003 - 1.035     Blood Urine Trace (A) Negative     pH Urine 7.0 5.0 - 7.0     Protein Albumin Urine Negative Negative mg/dL     Urobilinogen Urine 0.2 0.2, 1.0 E.U./dL      "Nitrite Urine Negative Negative     Leukocyte Esterase Urine Negative Negative   UA Microscopic with Reflex to Culture     Status: Abnormal   Result Value Ref Range     Bacteria Urine Few (A) None Seen /HPF     RBC Urine 0-2 0-2 /HPF /HPF     WBC Urine None Seen 0-5 /HPF /HPF     I reviewed the urology visit from 9/12/2023:     Past Medical History     Medical History and Problem List   Hyperlipidemia  Depressive disorder    Medications   None    Surgical History   Foot surgery  Knee arthroscopy    Physical Exam     Patient Vitals for the past 24 hrs:   BP Temp Temp src Pulse Resp SpO2 Height Weight   04/04/25 1915 (!) 126/94 -- -- 54 -- 99 % -- --   04/04/25 1823 118/84 -- -- 58 18 99 % -- --   04/04/25 1621 (!) 143/115 97.7  F (36.5  C) Temporal 60 18 99 % 1.854 m (6' 1\") 113.1 kg (249 lb 5.4 oz)     Physical Exam  General: Overall stable and nontoxic appearing  HEENT: Conjunctivae clear, no scleral icterus, mucous membranes moist  Neuro: Alert, moving all extremities equally with intention  CV: Regular rate and rhythm, radial and DP pulses equal  Respiratory: No signs of respiratory distress, lungs clear to auscultation bilaterally   Abdomen: Soft, without rigidity or rebound throughout   No focal RLQ tenderness   No focal RUQ tenderness   No true CVA tenderness to palpation   : Prostate exam performed with ED RN as chaperone, prostate is not boggy nor tender  MSK: No lower extremity swelling or tenderness     Diagnostics     Lab Results   Labs Ordered and Resulted from Time of ED Arrival to Time of ED Departure   BASIC METABOLIC PANEL - Normal       Result Value    Sodium 139      Potassium 4.0      Chloride 103      Carbon Dioxide (CO2) 27      Anion Gap 9      Urea Nitrogen 12.1      Creatinine 1.04      GFR Estimate 86      Calcium 9.5      Glucose 85     CBC WITH PLATELETS AND DIFFERENTIAL    WBC Count 6.6      RBC Count 5.21      Hemoglobin 16.4      Hematocrit 46.0      MCV 88      MCH 31.5      MCHC " 35.7      RDW 11.7      Platelet Count 201      % Neutrophils 51      % Lymphocytes 40      % Monocytes 7      % Eosinophils 1      % Basophils 1      % Immature Granulocytes 1      NRBCs per 100 WBC 0      Absolute Neutrophils 3.3      Absolute Lymphocytes 2.6      Absolute Monocytes 0.5      Absolute Eosinophils 0.1      Absolute Basophils 0.0      Absolute Immature Granulocytes 0.0      Absolute NRBCs 0.0         Imaging   CT Abdomen Pelvis w Contrast   Final Result   IMPRESSION:    1.  Circumferential bladder wall thickening, correlate clinically for cystitis. There is likely an element of chronic outlet obstruction given marked prostate gland enlargement.   2.  No evidence for pyelonephritis.        Independent Interpretation   None    ED Course      Medications Administered   Medications   sodium chloride 0.9 % bag for CT scan flush (65 mLs Intravenous $Given 4/4/25 1712)   iopamidol (ISOVUE-370) solution 500 mL (100 mLs Intravenous $Given 4/4/25 1712)       Procedures   Procedures     Discussion of Management   None    ED Course   ED Course as of 04/04/25 2109 Fri Apr 04, 2025 1635 I obtained history and performed physical exam.   1812 Prostate exam negative    1918 I rechecked on the patient and prepared them for discharge.       Additional Documentation  None    Medical Decision Making / Diagnosis     CMS Diagnoses: None    MIPS       None    [facility]   Usama Agudelo is a 54 year old male with a history of hyperlipidemia, who presents to the emergency department with a chief complaint of blood clots in the urine and low back pain.  On examination he is stable nontoxic, sitting comfortably in the chair using phone.      Considered acute UTI, prostatitis, pyelonephritis, nephrolithiasis, kidney mass.  His examination is overall benign, he actually does not have any true CVA tenderness.  No changes to his urination pattern and prostate is nontender, doubt prostatitis.  UA is without signs of infection.  CT  abdomen pelvis demonstrates no mass, nephro or ureteral lithiasis.  There is thickening of the bladder wall and chronic outlet obstruction, which in the setting of a negative urinalysis I do not think this represents acute cystitis.  We discussed this and I are discussed that he should see his urologist for follow-up for further evaluation of the hematuria that he is experiencing.  At this time, no indication for hospitalization. Counseled close follow-up with primary care provider within the week. I discussed specific strict return precautions  and patient verbalized understanding, comfortable with plan for discharge.       Disposition   The patient was discharged.     Diagnosis     ICD-10-CM    1. Gross hematuria  R31.0       2. Acute bilateral low back pain without sciatica  M54.50       3. Bladder outlet obstruction  N32.0            Discharge Medications   Discharge Medication List as of 4/4/2025  7:35 PM            Scribe Disclosure:  I, Nasrin Ontiveros, am serving as a scribe at 4:53 PM on 4/4/2025 to document services personally performed by Shilpi Astorga MD based on my observations and the provider's statements to me.        Shilpi Astorga MD  04/07/25 1032

## 2025-04-04 NOTE — ED TRIAGE NOTES
Pt arrives from  for hematuria starting 2 days ago, bilateral kidney pain. UA at  shows no UTI. Denies fevers/chills. Denies dysuria. AVSS on RA.